# Patient Record
Sex: MALE | Race: WHITE | NOT HISPANIC OR LATINO | Employment: FULL TIME | URBAN - METROPOLITAN AREA
[De-identification: names, ages, dates, MRNs, and addresses within clinical notes are randomized per-mention and may not be internally consistent; named-entity substitution may affect disease eponyms.]

---

## 2018-04-08 ENCOUNTER — OFFICE VISIT (OUTPATIENT)
Dept: URGENT CARE | Facility: CLINIC | Age: 28
End: 2018-04-08
Payer: COMMERCIAL

## 2018-04-08 VITALS
DIASTOLIC BLOOD PRESSURE: 63 MMHG | SYSTOLIC BLOOD PRESSURE: 141 MMHG | OXYGEN SATURATION: 96 % | TEMPERATURE: 96.7 F | RESPIRATION RATE: 18 BRPM | HEART RATE: 72 BPM

## 2018-04-08 DIAGNOSIS — J01.00 ACUTE NON-RECURRENT MAXILLARY SINUSITIS: Primary | ICD-10-CM

## 2018-04-08 PROCEDURE — 99203 OFFICE O/P NEW LOW 30 MIN: CPT | Performed by: PHYSICIAN ASSISTANT

## 2018-04-08 RX ORDER — AZITHROMYCIN 250 MG/1
TABLET, FILM COATED ORAL
Qty: 6 TABLET | Refills: 0 | Status: SHIPPED | OUTPATIENT
Start: 2018-04-08 | End: 2018-04-12

## 2018-04-08 NOTE — PROGRESS NOTES
[facility]        NAME: Ellis Wagner Parent is a 32 y o  male  : 1990    MRN: [id number]  DATE: 2018  TIME: 12:57 PM    Assessment and Plan   Acute non-recurrent maxillary sinusitis [J01 00]  1  Acute non-recurrent maxillary sinusitis  azithromycin (ZITHROMAX) 250 mg tablet         Patient Instructions     Follow up with PCP in 3-5 days  Proceed to  ER if symptoms worsen  Chief Complaint     Chief Complaint   Patient presents with    Sinusitis     Pt c/o sinus congestion for weeks  History of Present Illness       Patient presents with complaints of sinus symptoms for several weeks  He complains of sinus pressure and severe congestion  He does have postnasal drip and cough from time to time  He denies any fever, chills, sweats, earache, sore throat, shortness of breath, wheezing  He denies history of asthma or allergies  He denies smoking  He has not taken any over-the-counter medications  Review of Systems   Review of Systems   Constitutional: Negative  HENT: Positive for congestion, postnasal drip, sinus pain and sinus pressure  Negative for dental problem, ear pain, facial swelling, hearing loss, mouth sores, nosebleeds, rhinorrhea, sneezing and sore throat  Eyes: Negative  Respiratory: Positive for cough  Negative for shortness of breath and wheezing  Gastrointestinal: Negative  Skin: Negative            Current Medications       Current Outpatient Prescriptions:     azithromycin (ZITHROMAX) 250 mg tablet, Take 2 tablets today then 1 tablet daily x 4 days, Disp: 6 tablet, Rfl: 0    Current Allergies     Allergies as of 2018 - Reviewed 2018   Allergen Reaction Noted    Penicillins  2018            The following portions of the patient's history were reviewed and updated as appropriate: allergies, current medications, past family history, past medical history, past social history, past surgical history and problem list     Objective /63   Pulse 72   Temp (!) 96 7 °F (35 9 °C)   Resp 18   SpO2 96%        Physical Exam     Physical Exam   Constitutional: He appears well-developed and well-nourished  No distress  HENT:   Right Ear: Tympanic membrane, external ear and ear canal normal    Left Ear: Tympanic membrane, external ear and ear canal normal    Nose: Mucosal edema present  No rhinorrhea  Right sinus exhibits maxillary sinus tenderness  Mouth/Throat: Oropharynx is clear and moist  No oropharyngeal exudate, posterior oropharyngeal edema or posterior oropharyngeal erythema  Eyes: Conjunctivae are normal    Cardiovascular: Normal rate and regular rhythm  Pulmonary/Chest: Effort normal and breath sounds normal    Lymphadenopathy:        Head (right side): No tonsillar adenopathy present  Head (left side): No tonsillar adenopathy present  Nursing note and vitals reviewed

## 2018-04-08 NOTE — PATIENT INSTRUCTIONS
Sinusitis:  Start Z-Beto as directed  Advised also to  over-the-counter Flonase for nasal congestion  Rhinosinusitis   WHAT YOU NEED TO KNOW:   Rhinosinusitis (RS) is inflammation of your nose and sinuses  It commonly begins as a virus, often as a common cold  Viruses usually last 7 to 10 days and do not need treatment  When the virus does not get better on its own, you may have bacterial RS  This means that bacteria have begun to grow inside your sinuses  Acute RS lasts less than 4 weeks  Chronic RS lasts 12 weeks or more  Recurrent RS is when you have 4 or more episodes of RS in one year  DISCHARGE INSTRUCTIONS:   Return to the emergency department if:   · Your eye and eyelid are red, swollen, and painful  · You cannot open your eye  · You have double vision or you cannot see  · Your eyeball bulges out or you cannot move your eye  · You are more sleepy than normal or you notice changes in your ability to think, move, or talk  · You have a stiff neck, a fever, or a bad headache  · You have swelling of your forehead or scalp  Contact your healthcare provider if:   · Your symptoms are worse or do not improve after 3 to 5 days of treatment  · You have questions or concerns about your condition or care  Medicines: You may need any of the following:  · Acetaminophen  decreases pain and fever  It is available without a doctor's order  Ask how much to take and how often to take it  Follow directions  Acetaminophen can cause liver damage if not taken correctly  · NSAIDs , such as ibuprofen, help decrease swelling, pain, and fever  This medicine is available with or without a doctor's order  NSAIDs can cause stomach bleeding or kidney problems in certain people  If you take blood thinner medicine, always ask your healthcare provider if NSAIDs are safe for you  Always read the medicine label and follow directions      · Nasal steroid sprays  decrease inflammation in your nose and sinuses  · Decongestants  reduce swelling and drain mucus in the nose and sinuses  They may help you breathe easier  · Antihistamines  dry mucus in the nose and relieve sneezing  · Antibiotics  treat a bacterial infection and may be needed if your symptoms do not improve or they get worse  · Take your medicine as directed  Contact your healthcare provider if you think your medicine is not helping or if you have side effects  Tell him or her if you are allergic to any medicine  Keep a list of the medicines, vitamins, and herbs you take  Include the amounts, and when and why you take them  Bring the list or the pill bottles to follow-up visits  Carry your medicine list with you in case of an emergency  Self-care:   · Rinse your sinuses  Use a sinus rinse device to rinse your nasal passages with a saline (salt water) solution  This will help thin the mucus in your nose and rinse away pollen and dirt  It will also help reduce swelling so you can breathe normally  Ask your healthcare provider how often to do this  · Breathe in steam   Heat a bowl of water until you see steam  Lean over the bowl and make a tent over your head with a large towel  Breathe deeply for about 20 minutes  Be careful not to get too close to the steam or burn yourself  Do this 3 times a day  You can also breathe deeply when you take a hot shower  · Sleep with your head elevated  Place an extra pillow under your head before you go to sleep to help your sinuses drain  · Drink liquids as directed  Ask your healthcare provider how much liquid to drink each day and which liquids are best for you  Liquids will thin the mucus in your nose and help it drain  Avoid drinks that contain alcohol or caffeine  · Do not smoke, and avoid secondhand smoke  Nicotine and other chemicals in cigarettes and cigars can make your symptoms worse  Ask your healthcare provider for information if you currently smoke and need help to quit  E-cigarettes or smokeless tobacco still contain nicotine  Talk to your healthcare provider before you use these products  Follow up with your healthcare provider as directed: Follow up if your symptoms are worse or not better after 3 to 5 days of treatment  Write down your questions so you remember to ask them during your visits  © 2017 [address] [employer] Information is for End User's use only and may not be sold, redistributed or otherwise used for commercial purposes  All illustrations and images included in CareNotes® are the copyrighted property of [employer]  or [address]  The above information is an  only  It is not intended as medical advice for individual conditions or treatments  Talk to your doctor, nurse or pharmacist before following any medical regimen to see if it is safe and effective for you

## 2019-07-28 ENCOUNTER — HOSPITAL ENCOUNTER (EMERGENCY)
Facility: HOSPITAL | Age: 29
Discharge: HOME | End: 2019-07-29
Attending: EMERGENCY MEDICINE | Admitting: EMERGENCY MEDICINE
Payer: COMMERCIAL

## 2019-07-28 DIAGNOSIS — S40.811A ABRASION OF RIGHT UPPER EXTREMITY, INITIAL ENCOUNTER: ICD-10-CM

## 2019-07-28 DIAGNOSIS — S90.32XA CONTUSION OF LEFT FOOT, INITIAL ENCOUNTER: Primary | ICD-10-CM

## 2019-07-28 PROCEDURE — 99283 EMERGENCY DEPT VISIT LOW MDM: CPT

## 2019-07-28 PROCEDURE — 99283 EMERGENCY DEPT VISIT LOW MDM: CPT | Performed by: EMERGENCY MEDICINE

## 2019-07-28 RX ORDER — IBUPROFEN 600 MG/1
600 TABLET ORAL ONCE
Status: COMPLETED | OUTPATIENT
Start: 2019-07-29 | End: 2019-07-29

## 2019-07-29 ENCOUNTER — APPOINTMENT (EMERGENCY)
Dept: RADIOLOGY | Facility: HOSPITAL | Age: 29
End: 2019-07-29
Payer: COMMERCIAL

## 2019-07-29 VITALS
DIASTOLIC BLOOD PRESSURE: 58 MMHG | WEIGHT: 196.21 LBS | SYSTOLIC BLOOD PRESSURE: 111 MMHG | HEART RATE: 78 BPM | OXYGEN SATURATION: 96 % | TEMPERATURE: 98 F | RESPIRATION RATE: 18 BRPM

## 2019-07-29 PROCEDURE — 73630 X-RAY EXAM OF FOOT: CPT

## 2019-07-29 PROCEDURE — 73610 X-RAY EXAM OF ANKLE: CPT

## 2019-07-29 RX ADMIN — IBUPROFEN 600 MG: 600 TABLET ORAL at 00:09

## 2019-07-29 NOTE — ED PROVIDER NOTES
History  Chief Complaint   Patient presents with    Ankle Pain     Patient was building a swing set and fell off around 1900 striking left ankle  Patient also has abrasion to right forearm      This is an otherwise healthy 54-year-old male who was building a swing set and fell off at approximately 7:00 p m  Tonight  States that he struck his left ankle when landing on the ground  Denies twisting it  The patient was ambulatory afterwards  He does have an abrasion to his right forearm  He is up-to-date on his tetanus  Denies fever/chills, nausea/vomiting, URI symptoms, chest pain, palpitations, shortness of breath, cough, neck pain, back pain, flank pain, abdominal pain, diarrhea, hematochezia, melena, dysuria  None       History reviewed  No pertinent past medical history  Past Surgical History:   Procedure Laterality Date    SHOULDER SURGERY         History reviewed  No pertinent family history  I have reviewed and agree with the history as documented  Social History     Tobacco Use    Smoking status: Never Smoker    Smokeless tobacco: Never Used   Substance Use Topics    Alcohol use: Not Currently    Drug use: Not Currently        Review of Systems   Constitutional: Negative for chills and fever  HENT: Negative for congestion, rhinorrhea, sore throat and trouble swallowing  Respiratory: Negative for cough, chest tightness, shortness of breath and wheezing  Cardiovascular: Negative for chest pain and palpitations  Gastrointestinal: Negative for abdominal pain, blood in stool, diarrhea, nausea and vomiting  Musculoskeletal: Positive for arthralgias  Negative for back pain and neck pain  Skin: Positive for wound  All other systems reviewed and are negative  Physical Exam  Physical Exam   Constitutional: Vital signs are normal  He appears well-developed and well-nourished  He is cooperative  Non-toxic appearance  He does not appear ill  HENT:   Head: Normocephalic  Mouth/Throat: Uvula is midline, oropharynx is clear and moist and mucous membranes are normal  No tonsillar exudate  Eyes: Pupils are equal, round, and reactive to light  Conjunctivae, EOM and lids are normal    Cardiovascular: Normal rate, regular rhythm, normal heart sounds, intact distal pulses and normal pulses  Pulses:       Radial pulses are 2+ on the right side, and 2+ on the left side  Pulmonary/Chest: Effort normal and breath sounds normal    Abdominal: Soft  Normal appearance and bowel sounds are normal  There is no tenderness  There is no rigidity, no rebound, no guarding and no CVA tenderness  Musculoskeletal:   Small area of focal ecchymosis just distal to left medial malleolus  Focally tender over this area  No tenderness over the ankle or the rest of the foot  Patient able to fully range ankle and foot  Negative anterior and posterior drawer  Sensation is fully intact  DP and PT pulses are 2+  Foot is pink, warm, perfusing well  Neurological: He is alert  Psychiatric: He has a normal mood and affect   His speech is normal and behavior is normal        Vital Signs  ED Triage Vitals [07/28/19 2324]   Temperature Pulse Respirations Blood Pressure SpO2   98 °F (36 7 °C) 86 19 132/75 99 %      Temp Source Heart Rate Source Patient Position - Orthostatic VS BP Location FiO2 (%)   Temporal Monitor Sitting Right arm --      Pain Score       8           Vitals:    07/28/19 2324   BP: 132/75   Pulse: 86   Patient Position - Orthostatic VS: Sitting         Visual Acuity      ED Medications  Medications   ibuprofen (MOTRIN) tablet 600 mg (600 mg Oral Given 7/29/19 0009)       Diagnostic Studies  Results Reviewed     None                 XR ankle 3+ views LEFT    (Results Pending)   XR foot 3+ views LEFT    (Results Pending)              Procedures  Procedures       ED Course                               MDM  Number of Diagnoses or Management Options  Diagnosis management comments: Likely contusion of left foot  Will check an ankle and foot x-ray  Ultimately, follow up with primary care doctor  Disposition  Final diagnoses:   Contusion of left foot, initial encounter   Abrasion of right upper extremity, initial encounter     Time reflects when diagnosis was documented in both MDM as applicable and the Disposition within this note     Time User Action Codes Description Comment    7/29/2019  1:14 AM Benson MCKENNA Add [S90 32XA] Contusion of left foot, initial encounter     7/29/2019  1:15 AM Oumar Jackson Add [S40 811A] Abrasion of right upper extremity, initial encounter       ED Disposition     ED Disposition Condition Date/Time Comment    Discharge Stable Mon Jul 29, 2019  1:14 AM [address] Parent discharge to home/self care  Follow-up Information     Follow up With Specialties Details Why Contact Info Additional [address] [facility] Emergency Medicine Go to  If symptoms worsen [address] [id number]  [phone] [place] ED, [address], [address]          Patient's Medications    No medications on file     No discharge procedures on file      ED Provider  Electronically Signed by           Raheem Cai MD  07/29/19 5854

## 2019-11-20 ENCOUNTER — OFFICE VISIT (OUTPATIENT)
Dept: URGENT CARE | Facility: CLINIC | Age: 29
End: 2019-11-20
Payer: COMMERCIAL

## 2019-11-20 VITALS
TEMPERATURE: 99.8 F | OXYGEN SATURATION: 97 % | WEIGHT: 190 LBS | HEIGHT: 67 IN | RESPIRATION RATE: 18 BRPM | SYSTOLIC BLOOD PRESSURE: 130 MMHG | DIASTOLIC BLOOD PRESSURE: 62 MMHG | BODY MASS INDEX: 29.82 KG/M2 | HEART RATE: 88 BPM

## 2019-11-20 DIAGNOSIS — J01.90 ACUTE NON-RECURRENT SINUSITIS, UNSPECIFIED LOCATION: Primary | ICD-10-CM

## 2019-11-20 PROCEDURE — 99213 OFFICE O/P EST LOW 20 MIN: CPT | Performed by: PHYSICIAN ASSISTANT

## 2019-11-20 RX ORDER — AZITHROMYCIN 250 MG/1
TABLET, FILM COATED ORAL
Qty: 6 TABLET | Refills: 0 | Status: SHIPPED | OUTPATIENT
Start: 2019-11-20 | End: 2019-11-24

## 2019-11-20 RX ORDER — METHYLPREDNISOLONE 4 MG/1
TABLET ORAL
Qty: 1 EACH | Refills: 0 | Status: SHIPPED | OUTPATIENT
Start: 2019-11-20

## 2019-11-20 NOTE — PROGRESS NOTES
[facility]        NAME: Dain Peterson Parent is a 34 y o  male  : 1990    MRN: [id number]  DATE: 2019  TIME: 11:25 AM    Assessment and Plan   Acute non-recurrent sinusitis, unspecified location [J01 90]  1  Acute non-recurrent sinusitis, unspecified location  azithromycin (ZITHROMAX) 250 mg tablet    methylPREDNISolone 4 MG tablet therapy pack         Patient Instructions       Follow up with PCP in 3-5 days  Proceed to  ER if symptoms worsen  Chief Complaint     Chief Complaint   Patient presents with    Facial Pain     Sinus pressure and face pain for 2 weeks         History of Present Illness       Patient presents with a 2 week history of sinus pressure and pain  The past few he has a lot of thick purulent nasal drainage and now has a cough the same mucus  He denies any fever chills chest pain shortness of breath  He has been taking over-the-counter medications without any improvement  Review of Systems   Review of Systems   Constitutional: Negative for chills and fever  HENT: Positive for congestion, postnasal drip, rhinorrhea, sinus pressure and sinus pain  Negative for ear pain and sore throat  Respiratory: Positive for cough  Negative for wheezing  Gastrointestinal: Negative for nausea and vomiting  Musculoskeletal: Negative for myalgias  Skin: Negative for rash  Neurological: Negative for light-headedness  Hematological: Negative for adenopathy           Current Medications       Current Outpatient Medications:     azithromycin (ZITHROMAX) 250 mg tablet, Take 2 tablets today then 1 tablet daily x 4 days, Disp: 6 tablet, Rfl: 0    methylPREDNISolone 4 MG tablet therapy pack, Use as directed on package, Disp: 1 each, Rfl: 0    Current Allergies     Allergies as of 2019 - Reviewed 2019   Allergen Reaction Noted    Penicillins Hives 2018    Penicillins Rash 2019            The following portions of the patient's history were reviewed and updated as appropriate: allergies, current medications, past family history, past medical history, past social history, past surgical history and problem list      History reviewed  No pertinent past medical history  Past Surgical History:   Procedure Laterality Date    SHOULDER SURGERY         History reviewed  No pertinent family history  Medications have been verified  Objective   /62   Pulse 88   Temp 99 8 °F (37 7 °C) (Tympanic)   Resp 18   Ht 5' 7" (1 702 m)   Wt 86 2 kg (190 lb)   SpO2 97%   BMI 29 76 kg/m²        Physical Exam     Physical Exam   Constitutional: He is oriented to person, place, and time  He appears well-developed and well-nourished  HENT:   Head: Normocephalic and atraumatic  Right Ear: External ear normal    Left Ear: External ear normal    Mouth/Throat: Oropharynx is clear and moist    Bilateral nasal congestion  Neck: Neck supple  Cardiovascular: Normal rate, regular rhythm and normal heart sounds  Pulmonary/Chest: Effort normal and breath sounds normal    Lymphadenopathy:     He has no cervical adenopathy  Neurological: He is alert and oriented to person, place, and time  Skin: Skin is warm and dry  No rash noted  Psychiatric: He has a normal mood and affect  His behavior is normal    Nursing note and vitals reviewed

## 2019-11-20 NOTE — PATIENT INSTRUCTIONS

## 2023-01-14 ENCOUNTER — OFFICE VISIT (OUTPATIENT)
Dept: URGENT CARE | Facility: CLINIC | Age: 33
End: 2023-01-14

## 2023-01-14 VITALS
HEART RATE: 66 BPM | SYSTOLIC BLOOD PRESSURE: 124 MMHG | TEMPERATURE: 98.3 F | RESPIRATION RATE: 18 BRPM | OXYGEN SATURATION: 97 % | WEIGHT: 192 LBS | DIASTOLIC BLOOD PRESSURE: 66 MMHG | BODY MASS INDEX: 30.13 KG/M2 | HEIGHT: 67 IN

## 2023-01-14 DIAGNOSIS — J01.90 ACUTE SINUSITIS, RECURRENCE NOT SPECIFIED, UNSPECIFIED LOCATION: Primary | ICD-10-CM

## 2023-01-14 RX ORDER — AZITHROMYCIN 250 MG/1
TABLET, FILM COATED ORAL
Qty: 6 TABLET | Refills: 0 | Status: SHIPPED | OUTPATIENT
Start: 2023-01-14 | End: 2023-01-18

## 2023-01-14 NOTE — PROGRESS NOTES
[facility]    NAME: Marlen Sweet Parent is a 28 y o  male  : 1990    MRN: [id number]  DATE: 2023  TIME: 12:07 PM    Assessment and Plan   Acute sinusitis, recurrence not specified, unspecified location [J01 90]  1  Acute sinusitis, recurrence not specified, unspecified location  azithromycin (ZITHROMAX) 250 mg tablet          Patient Instructions   Patient Instructions   I have prescribed an antibiotic for the infection  Please take the antibiotic as prescribed and finish the entire prescription  I recommend that the patient takes an over the counter probiotic or eats yogurt with live cultures in it [place]) to keep good bacteria in the gut and help prevent diarrhea  Wash hands frequently to prevent the spread of infection  Can use over the counter cough and cold medications to help with symptoms  Ibuprofen and/or tylenol as needed for pain or fever  If not improving over the next 7-10 days, follow up with PCP  Chief Complaint     Chief Complaint   Patient presents with   • Nasal Congestion     X1 wk: cough, runny nose, post nasal drip, nasal congestion, sinus headaches, decreased appetite but pushing fluids, fatigue  No flu shot  No covid tests  Hx of sinus infections  Not a smoker  History of Present Illness   28 Year old male here with complaint of "sinus infection"   States he has been sick since [place] and it just isn't going away  Has nasal congestion, post nasal drip, cough  Review of Systems   Review of Systems   Constitutional: Negative for chills, fatigue and fever  HENT: Positive for congestion, postnasal drip and sinus pressure  Negative for ear pain and sore throat  Respiratory: Positive for cough  Negative for shortness of breath and wheezing  Neurological: Negative for headaches  All other systems reviewed and are negative        Current Medications     Current Outpatient Medications:   •  azithromycin (ZITHROMAX) 250 mg tablet, Take 2 tablets today then 1 tablet daily x 4 days, Disp: 6 tablet, Rfl: 0  •  methylPREDNISolone 4 MG tablet therapy pack, Use as directed on package (Patient not taking: Reported on 1/14/2023), Disp: 1 each, Rfl: 0    Current Allergies     Allergies as of 01/14/2023 - Reviewed 01/14/2023   Allergen Reaction Noted   • Penicillins Hives 04/08/2018   • Penicillins Rash 07/28/2019          The following portions of the patient's history were reviewed and updated as appropriate: allergies, current medications, past family history, past medical history, past social history, past surgical history and problem list    No past medical history on file  Past Surgical History:   Procedure Laterality Date   • SHOULDER SURGERY       No family history on file  Social History     Socioeconomic History   • Marital status: /Civil Union     Spouse name: Not on file   • Number of children: Not on file   • Years of education: Not on file   • Highest education level: Not on file   Occupational History   • Not on file   Tobacco Use   • Smoking status: Never   • Smokeless tobacco: Never   Substance and Sexual Activity   • Alcohol use: Not Currently   • Drug use: Not Currently   • Sexual activity: Not on file   Other Topics Concern   • Not on file   Social History Narrative   • Not on file     Social Determinants of Health     Financial Resource Strain: Not on file   Food Insecurity: Not on file   Transportation Needs: Not on file   Physical Activity: Not on file   Stress: Not on file   Social Connections: Not on file   Intimate Partner Violence: Not on file   Housing Stability: Not on file     Medications have been verified  Objective   /66 (BP Location: Right arm, Patient Position: Sitting)   Pulse 66   Temp 98 3 °F (36 8 °C)   Resp 18   Ht 5' 7" (1 702 m)   Wt 87 1 kg (192 lb)   SpO2 97%   BMI 30 07 kg/m²      Physical Exam   Physical Exam  Vitals and nursing note reviewed     Constitutional:       General: He is not in acute distress  Appearance: He is well-developed  HENT:      Head: Normocephalic and atraumatic  Right Ear: Tympanic membrane normal       Left Ear: Tympanic membrane normal       Nose: Congestion present  No mucosal edema  Mouth/Throat:      Mouth: Mucous membranes are moist       Pharynx: Posterior oropharyngeal erythema present  Comments: Post nasal drip  Cardiovascular:      Rate and Rhythm: Normal rate and regular rhythm  Heart sounds: Normal heart sounds  Pulmonary:      Effort: Pulmonary effort is normal  No respiratory distress  Breath sounds: Normal breath sounds